# Patient Record
Sex: MALE | Race: WHITE | Employment: OTHER | URBAN - METROPOLITAN AREA
[De-identification: names, ages, dates, MRNs, and addresses within clinical notes are randomized per-mention and may not be internally consistent; named-entity substitution may affect disease eponyms.]

---

## 2017-10-26 ENCOUNTER — HOSPITAL ENCOUNTER (OUTPATIENT)
Dept: CT IMAGING | Age: 67
Discharge: HOME | End: 2017-10-26
Attending: OTOLARYNGOLOGY
Payer: MEDICARE

## 2017-10-26 DIAGNOSIS — J32.9 CHRONIC SINUSITIS, UNSPECIFIED LOCATION: ICD-10-CM

## 2017-10-26 PROCEDURE — 70486 CT MAXILLOFACIAL W/O DYE: CPT

## 2018-05-11 PROBLEM — H54.40 BLIND LEFT EYE: Chronic | Status: ACTIVE | Noted: 2018-05-11

## 2018-05-14 ENCOUNTER — HOSPITAL ENCOUNTER (OUTPATIENT)
Dept: CT IMAGING | Age: 68
Discharge: HOME | End: 2018-05-14
Attending: INTERNAL MEDICINE
Payer: MEDICARE

## 2018-05-14 DIAGNOSIS — R06.02 SHORTNESS OF BREATH: ICD-10-CM

## 2018-05-14 DIAGNOSIS — R07.9 CHEST PAIN, UNSPECIFIED TYPE: ICD-10-CM

## 2018-05-14 PROCEDURE — 71260 CT THORAX DX C+: CPT

## 2018-05-14 PROCEDURE — 74011000258 HC RX REV CODE- 258: Performed by: INTERNAL MEDICINE

## 2018-05-14 PROCEDURE — 74011636320 HC RX REV CODE- 636/320: Performed by: INTERNAL MEDICINE

## 2018-05-14 RX ORDER — SODIUM CHLORIDE 0.9 % (FLUSH) 0.9 %
10 SYRINGE (ML) INJECTION
Status: COMPLETED | OUTPATIENT
Start: 2018-05-14 | End: 2018-05-14

## 2018-05-14 RX ADMIN — IOPAMIDOL 100 ML: 755 INJECTION, SOLUTION INTRAVENOUS at 09:51

## 2018-05-14 RX ADMIN — SODIUM CHLORIDE 100 ML: 900 INJECTION, SOLUTION INTRAVENOUS at 09:51

## 2018-05-14 RX ADMIN — Medication 10 ML: at 09:51
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
## 2018-05-24 ENCOUNTER — HOSPITAL ENCOUNTER (OUTPATIENT)
Dept: PET IMAGING | Age: 68
Discharge: HOME | End: 2018-05-24
Payer: MEDICARE

## 2018-05-24 DIAGNOSIS — R91.1 LUNG NODULE: ICD-10-CM

## 2018-05-24 PROCEDURE — 74011636320 HC RX REV CODE- 636/320: Performed by: INTERNAL MEDICINE

## 2018-05-24 PROCEDURE — A9552 F18 FDG: HCPCS

## 2018-05-24 RX ORDER — SODIUM CHLORIDE 0.9 % (FLUSH) 0.9 %
5-10 SYRINGE (ML) INJECTION
Status: COMPLETED | OUTPATIENT
Start: 2018-05-24 | End: 2018-05-24

## 2018-05-24 RX ADMIN — DIATRIZOATE MEGLUMINE AND DIATRIZOATE SODIUM 10 ML: 660; 100 LIQUID ORAL; RECTAL at 11:05

## 2018-05-24 RX ADMIN — Medication 10 ML: at 11:05

## 2018-05-24 NOTE — PROGRESS NOTES
Pt notified of the following;  Good news- no other worrisome areas were seen throughout his body! The nodule we had previously seen looks about the same and will need to be biopsied to determine if this is scar tissue or cancer. He has consult with Pulmonary next week, at which time they can make arrangements for biopsy.

## 2018-05-24 NOTE — PROGRESS NOTES
Good news- no other worrisome areas were seen throughout his body! The nodule we had previously seen looks about the same and will need to be biopsied to determine if this is scar tissue or cancer. He has consult with Pulmonary next week, at which time they can make arrangements for biopsy.

## 2018-06-28 ENCOUNTER — HOSPITAL ENCOUNTER (OUTPATIENT)
Age: 68
Setting detail: OUTPATIENT SURGERY
Discharge: HOME | End: 2018-06-28
Attending: INTERNAL MEDICINE | Admitting: INTERNAL MEDICINE
Payer: MEDICARE

## 2018-06-28 ENCOUNTER — HOSPITAL ENCOUNTER (OUTPATIENT)
Dept: CT IMAGING | Age: 68
Discharge: HOME | End: 2018-06-28
Attending: INTERNAL MEDICINE
Payer: MEDICARE

## 2018-06-28 ENCOUNTER — APPOINTMENT (OUTPATIENT)
Dept: GENERAL RADIOLOGY | Age: 68
End: 2018-06-28
Attending: INTERNAL MEDICINE
Payer: MEDICARE

## 2018-06-28 VITALS
WEIGHT: 119 LBS | HEART RATE: 94 BPM | HEIGHT: 67 IN | BODY MASS INDEX: 18.68 KG/M2 | RESPIRATION RATE: 17 BRPM | DIASTOLIC BLOOD PRESSURE: 73 MMHG | TEMPERATURE: 97.8 F | SYSTOLIC BLOOD PRESSURE: 108 MMHG | OXYGEN SATURATION: 94 %

## 2018-06-28 DIAGNOSIS — R91.1 LUNG NODULE: ICD-10-CM

## 2018-06-28 PROCEDURE — 31627 NAVIGATIONAL BRONCHOSCOPY: CPT | Performed by: INTERNAL MEDICINE

## 2018-06-28 PROCEDURE — 74011250636 HC RX REV CODE- 250/636

## 2018-06-28 PROCEDURE — 74011000250 HC RX REV CODE- 250: Performed by: INTERNAL MEDICINE

## 2018-06-28 PROCEDURE — 77030021922 HC FCPS BIOP SD DISP SPDM -D: Performed by: INTERNAL MEDICINE

## 2018-06-28 PROCEDURE — 88305 TISSUE EXAM BY PATHOLOGIST: CPT | Performed by: INTERNAL MEDICINE

## 2018-06-28 PROCEDURE — 88177 CYTP FNA EVAL EA ADDL: CPT | Performed by: INTERNAL MEDICINE

## 2018-06-28 PROCEDURE — 88173 CYTOPATH EVAL FNA REPORT: CPT | Performed by: INTERNAL MEDICINE

## 2018-06-28 PROCEDURE — 31624 DX BRONCHOSCOPE/LAVAGE: CPT | Performed by: INTERNAL MEDICINE

## 2018-06-28 PROCEDURE — 99153 MOD SED SAME PHYS/QHP EA: CPT | Performed by: INTERNAL MEDICINE

## 2018-06-28 PROCEDURE — 77030012699 HC VLV SUC CNTRL OCOA -A: Performed by: INTERNAL MEDICINE

## 2018-06-28 PROCEDURE — 74011250636 HC RX REV CODE- 250/636: Performed by: INTERNAL MEDICINE

## 2018-06-28 PROCEDURE — 31628 BRONCHOSCOPY/LUNG BX EACH: CPT | Performed by: INTERNAL MEDICINE

## 2018-06-28 PROCEDURE — 31652 BRONCH EBUS SAMPLNG 1/2 NODE: CPT | Performed by: INTERNAL MEDICINE

## 2018-06-28 PROCEDURE — 77030003406 HC NDL ASPIR BIOP OCOA -C: Performed by: INTERNAL MEDICINE

## 2018-06-28 PROCEDURE — 99152 MOD SED SAME PHYS/QHP 5/>YRS: CPT | Performed by: INTERNAL MEDICINE

## 2018-06-28 PROCEDURE — 77030031404 HC PTCH SENS SD DISP SPDM -A: Performed by: INTERNAL MEDICINE

## 2018-06-28 PROCEDURE — 77030028571 HC CATH ENDOBRNCH DISP BIOP KT SPMD -G1: Performed by: INTERNAL MEDICINE

## 2018-06-28 PROCEDURE — 71250 CT THORAX DX C-: CPT

## 2018-06-28 PROCEDURE — 76040000026: Performed by: INTERNAL MEDICINE

## 2018-06-28 PROCEDURE — 88112 CYTOPATH CELL ENHANCE TECH: CPT | Performed by: INTERNAL MEDICINE

## 2018-06-28 PROCEDURE — 31654 BRONCH EBUS IVNTJ PERPH LES: CPT | Performed by: INTERNAL MEDICINE

## 2018-06-28 PROCEDURE — 77030009046 HC CATH BRNCH BLLN OCOA -B: Performed by: INTERNAL MEDICINE

## 2018-06-28 PROCEDURE — 88172 CYTP DX EVAL FNA 1ST EA SITE: CPT | Performed by: INTERNAL MEDICINE

## 2018-06-28 PROCEDURE — 31623 DX BRONCHOSCOPE/BRUSH: CPT | Performed by: INTERNAL MEDICINE

## 2018-06-28 RX ORDER — LIDOCAINE HYDROCHLORIDE 20 MG/ML
JELLY TOPICAL ONCE
Status: COMPLETED | OUTPATIENT
Start: 2018-06-28 | End: 2018-06-28

## 2018-06-28 RX ORDER — FLUMAZENIL 0.1 MG/ML
0.2 INJECTION INTRAVENOUS
Status: DISCONTINUED | OUTPATIENT
Start: 2018-06-28 | End: 2018-06-28 | Stop reason: HOSPADM

## 2018-06-28 RX ORDER — NALOXONE HYDROCHLORIDE 0.4 MG/ML
0.4 INJECTION, SOLUTION INTRAMUSCULAR; INTRAVENOUS; SUBCUTANEOUS
Status: DISCONTINUED | OUTPATIENT
Start: 2018-06-28 | End: 2018-06-28 | Stop reason: HOSPADM

## 2018-06-28 RX ORDER — FENTANYL CITRATE 50 UG/ML
50 INJECTION, SOLUTION INTRAMUSCULAR; INTRAVENOUS
Status: DISCONTINUED | OUTPATIENT
Start: 2018-06-28 | End: 2018-06-28 | Stop reason: HOSPADM

## 2018-06-28 RX ORDER — MIDAZOLAM HYDROCHLORIDE 1 MG/ML
.25-5 INJECTION, SOLUTION INTRAMUSCULAR; INTRAVENOUS
Status: DISCONTINUED | OUTPATIENT
Start: 2018-06-28 | End: 2018-06-28 | Stop reason: HOSPADM

## 2018-06-28 RX ORDER — SODIUM CHLORIDE 0.9 % (FLUSH) 0.9 %
5-10 SYRINGE (ML) INJECTION EVERY 8 HOURS
Status: CANCELLED | OUTPATIENT
Start: 2018-06-28

## 2018-06-28 RX ORDER — DIPHENHYDRAMINE HYDROCHLORIDE 50 MG/ML
50 INJECTION, SOLUTION INTRAMUSCULAR; INTRAVENOUS ONCE
Status: DISCONTINUED | OUTPATIENT
Start: 2018-06-28 | End: 2018-06-28 | Stop reason: HOSPADM

## 2018-06-28 RX ORDER — LIDOCAINE HYDROCHLORIDE 40 MG/ML
SOLUTION TOPICAL ONCE
Status: COMPLETED | OUTPATIENT
Start: 2018-06-28 | End: 2018-06-28

## 2018-06-28 RX ORDER — SODIUM CHLORIDE 0.9 % (FLUSH) 0.9 %
5-10 SYRINGE (ML) INJECTION AS NEEDED
Status: CANCELLED | OUTPATIENT
Start: 2018-06-28

## 2018-06-28 RX ORDER — SODIUM CHLORIDE 9 MG/ML
1000 INJECTION, SOLUTION INTRAVENOUS CONTINUOUS
Status: DISCONTINUED | OUTPATIENT
Start: 2018-06-28 | End: 2018-06-28 | Stop reason: HOSPADM

## 2018-06-28 RX ADMIN — FENTANYL CITRATE 50 MCG: 50 INJECTION, SOLUTION INTRAMUSCULAR; INTRAVENOUS at 14:20

## 2018-06-28 RX ADMIN — MIDAZOLAM HYDROCHLORIDE 1 MG: 1 INJECTION, SOLUTION INTRAMUSCULAR; INTRAVENOUS at 14:15

## 2018-06-28 RX ADMIN — LIDOCAINE HYDROCHLORIDE: 40 SOLUTION TOPICAL at 15:08

## 2018-06-28 RX ADMIN — FENTANYL CITRATE 50 MCG: 50 INJECTION, SOLUTION INTRAMUSCULAR; INTRAVENOUS at 14:25

## 2018-06-28 RX ADMIN — FENTANYL CITRATE 50 MCG: 50 INJECTION, SOLUTION INTRAMUSCULAR; INTRAVENOUS at 14:05

## 2018-06-28 RX ADMIN — PROMETHAZINE HYDROCHLORIDE 12.5 MG: 25 INJECTION INTRAMUSCULAR; INTRAVENOUS at 14:15

## 2018-06-28 RX ADMIN — FENTANYL CITRATE 50 MCG: 50 INJECTION, SOLUTION INTRAMUSCULAR; INTRAVENOUS at 14:28

## 2018-06-28 RX ADMIN — MIDAZOLAM HYDROCHLORIDE 1 MG: 1 INJECTION, SOLUTION INTRAMUSCULAR; INTRAVENOUS at 14:30

## 2018-06-28 RX ADMIN — FENTANYL CITRATE 50 MCG: 50 INJECTION, SOLUTION INTRAMUSCULAR; INTRAVENOUS at 14:32

## 2018-06-28 RX ADMIN — MIDAZOLAM HYDROCHLORIDE 2 MG: 1 INJECTION, SOLUTION INTRAMUSCULAR; INTRAVENOUS at 14:05

## 2018-06-28 RX ADMIN — SODIUM CHLORIDE 1000 ML: 900 INJECTION, SOLUTION INTRAVENOUS at 14:05

## 2018-06-28 RX ADMIN — FENTANYL CITRATE 50 MCG: 50 INJECTION, SOLUTION INTRAMUSCULAR; INTRAVENOUS at 14:41

## 2018-06-28 RX ADMIN — FENTANYL CITRATE 50 MCG: 50 INJECTION, SOLUTION INTRAMUSCULAR; INTRAVENOUS at 14:10

## 2018-06-28 RX ADMIN — LIDOCAINE HYDROCHLORIDE: 20 JELLY TOPICAL at 15:08

## 2018-06-28 RX ADMIN — FENTANYL CITRATE 50 MCG: 50 INJECTION, SOLUTION INTRAMUSCULAR; INTRAVENOUS at 14:35

## 2018-06-28 RX ADMIN — FENTANYL CITRATE 50 MCG: 50 INJECTION, SOLUTION INTRAMUSCULAR; INTRAVENOUS at 14:15

## 2018-06-28 NOTE — ROUTINE PROCESS
Discharge instructions given to daughter. IV discontinued with skin c/d/i. Pt on room air with oxygen saturation of 97%. Pt ready for discharge.
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
## 2018-06-28 NOTE — IP AVS SNAPSHOT
[address] [facility] 
 
 
 [address] [facility] [address] [facility] 
[phone] Patient: Adelaide Medina MRN: [id number] WDR:[id number] About your hospitalization You were admitted on:  June 28, 2018 You last received care in the:  [facility] You were discharged on:  June 28, 2018 Why you were hospitalized Your primary diagnosis was:  Not on File Your diagnoses also included:  Lung Nodule Follow-up Information None Your Scheduled Appointments Thursday August 02, 2018  2:30 PM EDT Office Visit with Cecy Bunn MD  
[address]) [address] [facility] [address] [phone] Discharge Orders None A check janeen indicates which time of day the medication should be taken. My Medications ASK your doctor about these medications Instructions Each Dose to Equal  
 Morning Noon Evening Bedtime  
 lisinopril 10 mg tablet Commonly known as:  Abisai Cannelton Your last dose was: Your next dose is: Take 1 Tab by mouth daily. Indications: hypertension 10 mg Discharge Instructions RESPIRATORY CARE - BRONCHOSCOPY/NAVIGATION/ EBUS/BIOPSIES - DISCHARGE INSTRUCTIONS You received a lot of numbing medication for your throat and nose, and you also received medication to make you sleepy during your procedure. Because of this and because the bronchoscopy may have irritated your airways, we ask that you follow these directions: 1. Do not eat or drink until  345PM .   After that, you may have what you please. You may want to try some liquids first, because your throat may be a little sore. 2. Medication may cause drowsiness for several hours, therefore, do not drive or                  operate machinery for remainder of the day. No alcohol today. 3. You may cough up more mucus than usual and you may see some blood, but this is expected and should subside by the following day. 4. If severe throat irritation, coughing, or bleeding continue, call your doctor. 5.         If you run a fever greater than 102, take tylenol and motrin then call [facility] at [phone]. 6.         Dr. Matthew Goodman has asked that you: A. Call the doctor's office at [phone] for any questions or problems that may occur. [facility] will be in touch with you when pathology is back. Discharge Medications: 
Any additional instructions:  Resume all medication as before procedure using caution with any pain medication. Instructions given to Risa Montez and other family members Instructions given by: Introducing [facility]! Dear Adalgisa Peterson: Thank you for requesting a Packet[employer] account. Our records indicate that you already have an active Packetmotion account. You can access your account anytime at [email or web address] Did you know that you can access your hospital and ER discharge instructions at any time in Packetmotion? You can also review all of your test results from your hospital stay or ER visit. Additional Information If you have questions, please visit the Frequently Asked Questions section of the Packetmotion website at [email or web address] Remember, Packetmotion is NOT to be used for urgent needs. For medical emergencies, dial 911. Now available from your iPhone and Android! Introducing Simone Viramontes As a [facility] patient, I wanted to make you aware of our electronic visit tool called Simone Viramontes. [facility] 24/7 allows you to connect within minutes with a medical provider 24 hours a day, seven days a week via a mobile device or tablet or logging into a secure website from your computer.   You can access [facility] [employer] 24/7 from anywhere in the [place]. A virtual visit might be right for you when you have a simple condition and feel like you just dont want to get out of bed, or cant get away from work for an appointment, when your regular [facility] provider is not available (evenings, weekends or holidays), or when youre out of town and need minor care. Electronic visits cost only $49 and if the [employer] 24/7 provider determines a prescription is needed to treat your condition, one can be electronically transmitted to a nearby pharmacy*. Please take a moment to enroll today if you have not already done so. The enrollment process is free and takes just a few minutes. To enroll, please download the [employer] 24/7 armando to your tablet or phone, or visit [email or web address] to enroll on your computer. And, as an [address] [place] [address] patient with a [employer] account, the results of your visits will be scanned into your electronic medical record and your primary care provider will be able to view the scanned results. We urge you to continue to see your regular [facility] [facility] provider for your ongoing medical care. And while your primary care provider may not be the one available when you seek a [employer] virtual visit, the peace of mind you get from getting a real diagnosis real time can be priceless. For more information on [employer], view our Frequently Asked Questions (FAQs) at [email or web address]. Sincerely, 
 
Tee Gramajo MD 
Chief Medical Officer [employer] *:  certain medications cannot be prescribed via [employer] Unresulted tests-please follow up with your PCP on these results Procedure/Test Authorizing Provider [place] XR TECHNOLOGIST Yanci Ryan MD  
  
Providers Seen During Your Hospitalization Provider Specialty Primary office phone Mariana Bravo MD Pulmonary Disease [phone] Your Primary Care Physician (PCP) Primary Care Physician Office Phone Office Fax Taya Mcdermott [phone] You are allergic to the following Allergen Reactions Erythromycin Nausea and Vomiting Recent Documentation Height Weight BMI Smoking Status 1.702 m 54 kg 18.64 kg/m2 Former Smoker Emergency Contacts Name Discharge Info Relation Home Work Mobile Jordan Valdovinos CAREGIVER [3] Mother [14] [phone] Patient Belongings The following personal items are in your possession at time of discharge: 
  Dental Appliances: None  Visual Aid: Magnifying glass Please provide this summary of care documentation to your next provider. Signatures-by signing, you are acknowledging that this After Visit Summary has been reviewed with you and you have received a copy. Patient Signature:  ____________________________________________________________ Date:  ____________________________________________________________  
  
[facility] Signature:  ____________________________________________________________ Date:  ____________________________________________________________

## 2018-06-28 NOTE — DISCHARGE INSTRUCTIONS
RESPIRATORY CARE - BRONCHOSCOPY/NAVIGATION/ EBUS/BIOPSIES - DISCHARGE INSTRUCTIONS      You received a lot of numbing medication for your throat and nose, and you also received medication to make you sleepy during your procedure. Because of this and because the bronchoscopy may have irritated your airways, we ask that you follow these directions:    1. Do not eat or drink until  345PM .   After that, you may have what you please. You may want to try some liquids first, because your throat may be a little sore. 2. Medication may cause drowsiness for several hours, therefore, do not drive or                  operate machinery for remainder of the day. No alcohol today. 3. You may cough up more mucus than usual and you may see some blood, but this is expected and should subside by the following day. 4. If severe throat irritation, coughing, or bleeding continue, call your doctor. 5.         If you run a fever greater than 102, take tylenol and motrin then call [facility] at [phone]. 6.         Dr. Paul Downing has asked that you:                A. Call the doctor's office at [phone] for any questions or problems that may occur. [facility] will be in touch with you when pathology is back. Discharge Medications:  Any additional instructions:  Resume all medication as before procedure using caution with any pain medication.   Instructions given to Marvin Alejo and other family members  Instructions given by:

## 2018-06-28 NOTE — INTERVAL H&P NOTE
H&P Update:  Demi Vázquez was seen and examined. History and physical has been reviewed. The patient has been examined.  There have been no significant clinical changes since the completion of the originally dated History and Physical.    Signed By: Leodan Cody MD     June 28, 2018 1:29 PM

## 2018-06-28 NOTE — PROCEDURES
PROCEDURE  Bronchoscopy with Endobronchial Ultrasound Guided Fine Needle Aspiration Of Medistinal Lymph nodes and airway inspection and EMN aided biopsy of peripheral lung nodule. EQUIPMENT:  Olympus T180 bronchoscope, [employer] Dimension EMN system, 180 deg steareble sheath, Olympus [id number] EBUS scope, UM 17 Radial probe, Super D forceps, Super D triple needle brush, Fluoroscopy. INDICATION   Peripheral nodule/mass suspicious for malignancy/staging of presumed malignancy        POST OP DIAGNOSIS:  Super Dimension EMN system was employed to identify and biopsy the RLL nodule seen on CT/PET. 8  biopsies were obtained and evaluated via touch prep and GOGO. Touch preps revealed suspicious cells . A tripple brushing and BAL were also obtained. Histology from obtained tissue is currently pending. Following EMN procedure, linear EBUS was performed for mediastinal staging. Station 11Rs was the only target identifiable, biopsied and negative  for malignancy on GOGO. Based on CT chest/PET CT / and EBUS pt is a STAGE [T1b,N0,M0] TIA2 [place] lung Cancer once confirmed. SPIROMETRY:  FVC: 3.05 l;  (76 % predicted)  FEV1: 1.79 l;  (57 % predicted)  FEV1/FVC:      59 %  FEF 25-75:      0.80 l;  (27 % predicted)  No significant improvement after a bronchodilator. LUNG VOLUMES:   T l;  (112 % predicted)  FRC: 4.54 l;  (140 % predicted)  RV:    3.81 l;  (175 % predicted)    DIFFUSION CAPACITY:  DLCO:        12.0 ml/mmHg/min;  (42 % predicted)      IMPRESSION:  Spirometry and the flow volume loop indicate moderate airflow obstruction with a mildly reduced FVC.  Lung volumes indicate air trapping.  The diffusing capacity is severely reduced.  These findings are consistent with significant COPD.     Postop predicted values after right lower lobe lobectomy are calculated using the anatomic method as follows:  PPO-FEV1 1.05L (33% predicted)  PPO-DLCO 30.7% predicted    The postop predicted FEV1 and DLCO are both quite borderline but neither reflect any absolute contraindication to RLL lobectomy.  A CPET can be considered in borderline cases to confirm pulmonary reserve for resection. Niles Reddy MD    Patient will need to be presented in tumor board and referred to surgery. ANESTHESIA  Concious sedation with: Fentanyl 450 mcg IV; Versed 4 mg IV; Lidocaine 220 mg to tracheo-bronchial tree and vocal cords; Phenergan 12.5 mg IV  for nausea and vomiting prophylaxis. AIRWAY INSPECTION  After obtaining informed consent, using a bite block, an Olympus Q 180 viedeo bronchoscope was  introduced into the trachea through the vocal cords without complication. RIGHT  LOCATION NORM/ABNORMAL DESCRIPTION   VOCAL CORDS NL    TRACHEA NL    GERRY NL    RMSB NL    RUL NL    BI NL    RML NL    SUP SEGM RLL NL    MED BASAL NL    ANTERIOR BASAL NL    LATERAL BASAL NL    POSTERIOR BASAL NL                  LEFT  LOCATION NORMAL/ABNORMAL TYPE   LMSB NL    PANTERA NL    LINGULA NL    SUPERIOR DIVISION NL    SUPERIOR SEG LLL NL    MEGAN-MEDIAL LLL NL    LATERAL LLL NL    POSTERIOR LLL NL                         EBUS  After completing the airway inspection an Olympus  F EBUS bronchoscope was introduced into the trachea through the vocal chords without complication. The balloon was inflated with saline and a mediastinal inspection commenced:      STATION SIZE IN CM   11R sup 0.5/0.3, well demarcated and hypoechoic   11R inf No tgt   10R No tgt   7 No tgt   4R No tgt   11L No tgt   10L No tgt   4L No tgt   2L No tgt   2R No tgt         After identifying targets the following samples were obtained:    STATION PASS# [id number] MALIGNANT ATYPICAL GRANULOMA NONDGNSTC   11Rs 1 + - - - -    2 + - - - -    3 - - - - blood       © 2018 [employer] and/or its affiliates. All Rights Reserved.     T, N, and M descriptors for the eighth edition of TNM classification for lung cancer    T: Primary tumor   Tx Primary tumor cannot be assessed or tumor proven by presence of malignant cells in sputum or bronchial washings but not visualized by imaging or bronchoscopy   T0 No evidence of primary tumor   Tis Carcinoma in situ   T1 Tumor ? 3 cm in greatest dimension surrounded by lung or visceral pleura without bronchoscopic evidence of invasion more proximal than the lobar bronchus (ie, not in the main bronchus)*   T1a(mi) Minimally invasive adenocarcinoma¶   T1a Tumor ? 1 cm in greatest dimension*   T1b Tumor >1 cm but ?2 cm in greatest dimension*   T1c Tumor >2 cm but ?3 cm in greatest dimension*   T2 Tumor >3 cm but ?5 cm or tumor with any of the following features:?  · Involves main bronchus regardless of distance from the zack but without involvement of the zack  · Invades visceral pleura  · Associated with atelectasis or obstructive pneumonitis that extends to the hilar region, involving part or all of the lung   T2a Tumor >3 cm but ?4 cm in greatest dimension   T2b Tumor >4 cm but ?5 cm in greatest dimension   T3 Tumor >5 cm but ?7 cm in greatest dimension or associated with separate tumor nodule(s) in the same lobe as the primary tumor or directly invades any of the following structures: chest wall (including the parietal pleura and superior sulcus tumors), phrenic nerve, parietal pericardium   T4 Tumor >7 cm in greatest dimension or associated with separate tumor nodule(s) in a different ipsilateral lobe than that of the primary tumor or invades any of the following structures: diaphragm, mediastinum, heart, great vessels, trachea, recurrent laryngeal nerve, esophagus, vertebral body, and zack   N: Regional lymph node involvement   Nx Regional lymph nodes cannot be assessed   N0 No regional lymph node metastasis   N1 Metastasis in ipsilateral peribronchial and/or ipsilateral hilar lymph nodes and intrapulmonary nodes, including involvement by direct extension   N2 Metastasis in ipsilateral mediastinal and/or subcarinal lymph node(s)   N3 Metastasis in contralateral mediastinal, contralateral hilar, ipsilateral or contralateral scalene, or supraclavicular lymph node(s)   M: Distant metastasis   M0 No distant metastasis   M1 Distant metastasis present   M1a Separate tumor nodule(s) in a contralateral lobe; tumor with pleural or pericardial nodule(s) or malignant pleural or pericardial effusion? M1b Single extrathoracic metastasis§   M1c Multiple extrathoracic metastases in one or more organs   Stage groupings   Occult carcinoma TX N0 M0   Stage 0 Tis N0 M0   Stage IA1 T1a(mi) N0 M0    T1a N0 M0   Stage IA2 T1b N0 M0   Stage IA3 T1c N0 M0   Stage IB T2a N0 M0   Stage IIA T2b N0 M0   Stage IIB T1a to c N1 M0    T2a N1 M0    T2b N1 M0    T3 N0 M0   Stage IIIA T1a to c N2 M0    T2a to b N2 M0    T3 N1 M0    T4 N0 M0    T4 N1 M0   Stage IIIB T1a to c N3 M0    T2a to b N3 M0    T3 N2 M0    T4 N2 M0   Stage IIIC T3 N3 M0    T4 N3 M0   Stage SOLEDAD Any T Any N M1a    Any T Any N M1b   Stage IVB Any T Any N M1c   NOTE: Changes to the seventh edition are in bold. TNM: tumor, node, metastasis; Tis: carcinoma in situ; T1a(mi): minimally invasive adenocarcinoma. * The uncommon superficial spreading tumor of any size with its invasive component limited to the bronchial wall, which may extend proximal to the main bronchus, is also classified as T1a. ¶ Solitary adenocarcinoma, ?3 cm with a predominately lepidic pattern and ?5 mm invasion in any one focus. ? T2 tumors with these features are classified as T2a if ?4 cm in greatest dimension or if size cannot be determined, and T2b if >4 cm but ?5 cm in greatest dimension. ? Most pleural (pericardial) effusions with lung cancer are due to tumor. In a few patients, however, multiple microscopic examinations of pleural (pericardial) fluid are negative for tumor and the fluid is nonbloody and not an exudate.  When these elements and clinical judgment dictate that the effusion is not related to the tumor, the effusion should be excluded as a staging descriptor. § This includes involvement of a single distant (nonregional) lymph node. Reproduced from: Vania Alcocer et al. The [employer] Lung Cancer Staging Project: Proposals for Revision of the TNM Stage Groupings in the Forthcoming (Eighth) Edition of the TNM Classification for Lung Cancer. J Thorac Oncol 2016; 11:39. Table used with the permission of [employer]. All rights reserved. Graphic 292409 Version 1.0                                         Olympus T 180   bronchoscope wasintroduced into the airways to identify and biopsy the RLL nodule with the aid of Super D EMN system and radial probe US. CT image was acquired on with Super D protocol were used and the pathway to target  planned using super D planning software:      Planing data was then used to navigate to the nodule, after verification with radial US. 8  biopsies were obtained and evaluated via touch prep and GOGO. Touch preps revealed suspicious cells . A tripple brushing and BAL were also obtained. Histology from obtained tissue is currently pending. TOUCH PREP BIOPSY# MALIGNANT SUSPICIOUS ATYPIA NONDGNSTC   RLL NODULE   FORCEPS 1 - ++ -     2 - ++ -     3 - ++ -     4 - ++ -     5 -- -- -- In toto for histology    6 -- -- -- \"    7 -- -- -- \"    8 -- -- -- \"           RLL NODULE TRIPLE BRUSH 1 -- -- -- In tot for cytology           RLL NODULE BAL 1 -- -- -- In toto for cytology     EBL: 5 ml    Complications: NONE with no evidence of pneumothorax on post-op flouroscopy.     Dragan Jaramillo MD.

## 2018-09-17 ENCOUNTER — HOSPITAL ENCOUNTER (OUTPATIENT)
Dept: CT IMAGING | Age: 68
Discharge: HOME | End: 2018-09-17
Attending: RADIOLOGY
Payer: MEDICARE

## 2018-09-17 VITALS — HEIGHT: 66 IN | WEIGHT: 118 LBS | BODY MASS INDEX: 18.96 KG/M2

## 2018-09-17 DIAGNOSIS — C34.31 SMALL CELL LUNG CANCER, RIGHT LOWER LOBE (HCC): ICD-10-CM

## 2018-09-17 LAB — CREAT BLD-MCNC: 0.8 MG/DL (ref 0.8–1.5)

## 2018-09-17 PROCEDURE — 74011636320 HC RX REV CODE- 636/320: Performed by: RADIOLOGY

## 2018-09-17 PROCEDURE — 74011000258 HC RX REV CODE- 258: Performed by: RADIOLOGY

## 2018-09-17 PROCEDURE — 82565 ASSAY OF CREATININE: CPT

## 2018-09-17 PROCEDURE — 71260 CT THORAX DX C+: CPT

## 2018-09-17 RX ORDER — SODIUM CHLORIDE 0.9 % (FLUSH) 0.9 %
10 SYRINGE (ML) INJECTION
Status: COMPLETED | OUTPATIENT
Start: 2018-09-17 | End: 2018-09-17

## 2018-09-17 RX ADMIN — IOPAMIDOL 80 ML: 755 INJECTION, SOLUTION INTRAVENOUS at 13:13

## 2018-09-17 RX ADMIN — Medication 10 ML: at 13:13

## 2018-09-17 RX ADMIN — SODIUM CHLORIDE 100 ML: 900 INJECTION, SOLUTION INTRAVENOUS at 13:13

## 2018-11-30 PROBLEM — C34.31 MALIGNANT NEOPLASM OF LOWER LOBE OF RIGHT LUNG (HCC): Status: ACTIVE | Noted: 2018-07-26

## 2019-01-01 ENCOUNTER — HOSPITAL ENCOUNTER (OUTPATIENT)
Dept: GENERAL RADIOLOGY | Age: 69
Discharge: HOME | End: 2019-08-06
Attending: NURSE PRACTITIONER
Payer: MEDICARE

## 2019-01-01 ENCOUNTER — HOSPITAL ENCOUNTER (OUTPATIENT)
Dept: CT IMAGING | Age: 69
Discharge: HOME | End: 2019-09-17
Attending: RADIOLOGY
Payer: MEDICARE

## 2019-01-01 ENCOUNTER — HOSPITAL ENCOUNTER (OUTPATIENT)
Dept: LAB | Age: 69
Discharge: HOME | End: 2019-07-31

## 2019-01-01 ENCOUNTER — HOSPITAL ENCOUNTER (OUTPATIENT)
Dept: CT IMAGING | Age: 69
Discharge: HOME | End: 2019-03-20
Attending: RADIOLOGY
Payer: MEDICARE

## 2019-01-01 DIAGNOSIS — C34.31 MALIGNANT NEOPLASM OF LOWER LOBE OF RIGHT LUNG (HCC): ICD-10-CM

## 2019-01-01 DIAGNOSIS — C34.90 LUNG CANCER (HCC): ICD-10-CM

## 2019-01-01 DIAGNOSIS — R07.9 CHEST PAIN, UNSPECIFIED TYPE: ICD-10-CM

## 2019-01-01 LAB — CREAT BLD-MCNC: 0.8 MG/DL (ref 0.8–1.5)

## 2019-01-01 PROCEDURE — 74011000258 HC RX REV CODE- 258: Performed by: RADIOLOGY

## 2019-01-01 PROCEDURE — 74011636320 HC RX REV CODE- 636/320: Performed by: RADIOLOGY

## 2019-01-01 PROCEDURE — 71260 CT THORAX DX C+: CPT

## 2019-01-01 PROCEDURE — 71046 X-RAY EXAM CHEST 2 VIEWS: CPT

## 2019-01-01 PROCEDURE — 82565 ASSAY OF CREATININE: CPT

## 2019-01-01 PROCEDURE — 88305 TISSUE EXAM BY PATHOLOGIST: CPT

## 2019-01-01 RX ORDER — SODIUM CHLORIDE 0.9 % (FLUSH) 0.9 %
10 SYRINGE (ML) INJECTION
Status: COMPLETED | OUTPATIENT
Start: 2019-01-01 | End: 2019-01-01

## 2019-01-01 RX ADMIN — Medication 10 ML: at 10:10

## 2019-01-01 RX ADMIN — IOPAMIDOL 80 ML: 755 INJECTION, SOLUTION INTRAVENOUS at 10:50

## 2019-01-01 RX ADMIN — SODIUM CHLORIDE 100 ML: 900 INJECTION, SOLUTION INTRAVENOUS at 10:10

## 2019-01-01 RX ADMIN — SODIUM CHLORIDE 100 ML: 9 INJECTION, SOLUTION INTRAVENOUS at 10:50

## 2019-01-01 RX ADMIN — IOPAMIDOL 80 ML: 755 INJECTION, SOLUTION INTRAVENOUS at 10:10

## 2019-01-01 RX ADMIN — Medication 10 ML: at 10:50

## (undated) DEVICE — Z DUP USE 2381765 CATHETER DIAG 180DEG FIRM TIP EWC EDGE 180 [SDK3000OLYMPUS] [SUPERDIMENSION INC]

## (undated) DEVICE — Device: Brand: BALLOON

## (undated) DEVICE — KENDALL RADIOLUCENT FOAM MONITORING ELECTRODE RECTANGULAR SHAPE: Brand: KENDALL

## (undated) DEVICE — (D)SYR 10ML 1/5ML GRAD NSAF -- PKGING CHANGE USE ITEM 338027

## (undated) DEVICE — SOL INJ SOD CL0.9% 10ML PREFIL --

## (undated) DEVICE — BRONCHOSCOPY PACK: Brand: MEDLINE INDUSTRIES, INC.

## (undated) DEVICE — SINGLE USE BIOPSY VALVE MAJ-210: Brand: SINGLE USE BIOPSY VALVE (STERILE)

## (undated) DEVICE — SET EXTN L6IN W/ S STL CLMP

## (undated) DEVICE — SINGLE USE SUCTION VALVE MAJ-209: Brand: SINGLE USE SUCTION VALVE (STERILE)

## (undated) DEVICE — FORCEPS BX WRK L110MM CHN L2MM DIA1.7MM SUPERDIMENSION

## (undated) DEVICE — PATCH SENS PT FOR ELECTROMAGNETIC NAVIGATION BRONCHSCP SYS

## (undated) DEVICE — SINGLE USE ASPIRATION NEEDLE: Brand: SINGLE USE ASPIRATION NEEDLE

## (undated) DEVICE — BRUSH CYTO L115CM DIA1.9MM 3 NDL PULM USE SUPERDIMENSION

## (undated) DEVICE — ADAPTER BRONCHSCP FOR USE W/ OLY EDGE